# Patient Record
Sex: MALE | Race: WHITE | NOT HISPANIC OR LATINO | Employment: UNEMPLOYED | URBAN - METROPOLITAN AREA
[De-identification: names, ages, dates, MRNs, and addresses within clinical notes are randomized per-mention and may not be internally consistent; named-entity substitution may affect disease eponyms.]

---

## 2017-01-30 ENCOUNTER — ALLSCRIPTS OFFICE VISIT (OUTPATIENT)
Dept: OTHER | Facility: OTHER | Age: 62
End: 2017-01-30

## 2018-01-11 NOTE — RESULT NOTES
Message   Call patient  Patient with irregular plaque right carotid artery  Possible occlusion/stenosis left subclavian artery  Recommend referral to [facility] vascular surgery for further evaluation  Verified Results  VAS CAROTID COMPLETE STUDY [id number] 02:50PM Oliver Martinez Order Number: [id number]    - Patient Instructions: To schedule this appointment, please contact Central Scheduling at [phone]   Order Number: [id number]    - Patient Instructions: To schedule this appointment, please contact Central Scheduling at [phone]  Test Name Result Flag Reference   VAS CAROTID COMPLETE STUDY (Report)     [facility] REPORT   CLINICAL:   Indications: Visual Field Defect Unspecified [H53 40]  Patient complains of   dizzy spells for 2 months and occasional blurred vision which lasts 15 to 20   seconds  Patient has a history of afib  Risk Factors   The patient presents with visual changes  FINDINGS:      Right    PSV EDV (cm/s) Direction of Flow Ratio    Dist  ICA   68     42           0 95    Mid  ICA   63     32           0 88    Prox  ICA   42     26           0 59    Dist CCA   73     32                 Mid CCA    71     29           0 76    Prox CCA   94     23                 Ext Carotid  70     21           0 98    Prox Vert   45     24 Antegrade            Subclavian  78      8                    Left     PSV EDV (cm/s) Direction of Flow Ratio    Dist  ICA   90     31           0 90    Mid  ICA   81     30           0 82    Prox  ICA   81     24           0 81    Dist CCA   97     32                 Mid CCA   100     25           0 77    Prox CCA   130     31                 Ext Carotid 106     23           1 06    Prox Vert   39     10 Retrograde           Subclavian  61     11                          CONCLUSION:      Impression   RIGHT:   There is <50% stenosis noted in the internal carotid artery  Plaque is   heterogenous and irregular     Vertebral artery flow is antegrade  There is no significant subclavian artery   disease  LEFT:   There is <50% stenosis noted in the internal carotid artery  Plaque is   homogenous and smooth  Vertebral artery flow is retrograde with a 17mm Hg gradient difference between   arms indicative of subclavian artery stenosis/occlusion  Internal carotid artery stenosis determination by consensus criteria from:   Leanedr Mohan et al  Carotid Artery Stenosis: Gray-Scale and Doppler US Diagnosis   - [employer] in [address] [place] [address], Radiology 2003;   [phone]        SIGNATURE:   Electronically Signed by: Malu Avalos MD, [address] on 2016-09-14 08:13:18 PM

## 2018-01-13 VITALS
WEIGHT: 181 LBS | HEIGHT: 68 IN | BODY MASS INDEX: 27.43 KG/M2 | HEART RATE: 95 BPM | SYSTOLIC BLOOD PRESSURE: 112 MMHG | DIASTOLIC BLOOD PRESSURE: 70 MMHG